# Patient Record
Sex: MALE | Race: WHITE | NOT HISPANIC OR LATINO | Employment: UNEMPLOYED | URBAN - METROPOLITAN AREA
[De-identification: names, ages, dates, MRNs, and addresses within clinical notes are randomized per-mention and may not be internally consistent; named-entity substitution may affect disease eponyms.]

---

## 2021-04-22 ENCOUNTER — HOSPITAL ENCOUNTER (EMERGENCY)
Facility: HOSPITAL | Age: 21
Discharge: HOME | End: 2021-04-22
Attending: EMERGENCY MEDICINE | Admitting: EMERGENCY MEDICINE

## 2021-04-22 VITALS
OXYGEN SATURATION: 99 % | WEIGHT: 294.3 LBS | DIASTOLIC BLOOD PRESSURE: 96 MMHG | RESPIRATION RATE: 18 BRPM | BODY MASS INDEX: 39 KG/M2 | HEART RATE: 83 BPM | TEMPERATURE: 98.1 F | SYSTOLIC BLOOD PRESSURE: 145 MMHG | HEIGHT: 73 IN

## 2021-04-22 DIAGNOSIS — S61.412A LACERATION OF LEFT HAND WITHOUT FOREIGN BODY, INITIAL ENCOUNTER: Primary | ICD-10-CM

## 2021-04-22 PROCEDURE — 25010000002 TDAP 5-2.5-18.5 LF-MCG/0.5 SUSPENSION

## 2021-04-22 PROCEDURE — 99282 EMERGENCY DEPT VISIT SF MDM: CPT

## 2021-04-22 PROCEDURE — 90471 IMMUNIZATION ADMIN: CPT

## 2021-04-22 PROCEDURE — 90715 TDAP VACCINE 7 YRS/> IM: CPT

## 2021-04-22 PROCEDURE — 12001 RPR S/N/AX/GEN/TRNK 2.5CM/<: CPT | Performed by: EMERGENCY MEDICINE

## 2021-04-22 RX ORDER — LIDOCAINE HYDROCHLORIDE 10 MG/ML
10 INJECTION, SOLUTION EPIDURAL; INFILTRATION; INTRACAUDAL; PERINEURAL ONCE
Status: COMPLETED | OUTPATIENT
Start: 2021-04-22 | End: 2021-04-22

## 2021-04-22 RX ORDER — CEPHALEXIN 500 MG/1
500 CAPSULE ORAL 3 TIMES DAILY
Qty: 15 CAPSULE | Refills: 0 | Status: SHIPPED | OUTPATIENT
Start: 2021-04-22 | End: 2021-04-27

## 2021-04-22 RX ADMIN — TETANUS TOXOID, REDUCED DIPHTHERIA TOXOID AND ACELLULAR PERTUSSIS VACCINE, ADSORBED 0.5 ML: 5; 2.5; 8; 8; 2.5 SUSPENSION INTRAMUSCULAR at 10:45

## 2021-04-22 RX ADMIN — LIDOCAINE HYDROCHLORIDE 10 ML: 10 INJECTION, SOLUTION EPIDURAL; INFILTRATION; INTRACAUDAL; PERINEURAL at 11:00

## 2021-04-22 NOTE — ED PROVIDER NOTES
" EMERGENCY DEPARTMENT ENCOUNTER    Room Number:  09/09  Date seen:  4/22/2021  PCP: Provider, No Known  Historian: Patient      HPI:  Chief Complaint: Left hand laceration  A complete HPI/ROS/PMH/PSH/SH/FH are unobtainable due to: Nothing  Context: Hebert Ibarra is a 20 y.o. male who presents to the ED c/o laceration to his left hand that occurred today while he was using a pocket knife to cut up some sticks.  He reports that the knife struck his left hand.  He reports minimal bleeding prior to arrival.  His pain is currently mild, localized to the left hand, nonradiating.  He is unsure of his last tetanus booster.  He has been using the knife to cut up the sticks.  He denies any tingling or numbness in his fingers.  He denies any weakness in his thumb or fingers.            PAST MEDICAL HISTORY  Active Ambulatory Problems     Diagnosis Date Noted   • No Active Ambulatory Problems     Resolved Ambulatory Problems     Diagnosis Date Noted   • No Resolved Ambulatory Problems     No Additional Past Medical History         PAST SURGICAL HISTORY  History reviewed. No pertinent surgical history.      FAMILY HISTORY  History reviewed. No pertinent family history.      SOCIAL HISTORY  Social History     Socioeconomic History   • Marital status: Single     Spouse name: Not on file   • Number of children: Not on file   • Years of education: Not on file   • Highest education level: Not on file   Tobacco Use   • Smoking status: Never Smoker   Substance and Sexual Activity   • Alcohol use: Yes     Comment: \"hardly any\"   • Sexual activity: Never         ALLERGIES  Patient has no known allergies.        REVIEW OF SYSTEMS  Review of Systems   Review of all 14 systems is negative other than stated in the HPI above.      PHYSICAL EXAM  ED Triage Vitals [04/22/21 1038]   Temp Heart Rate Resp BP SpO2   98.1 °F (36.7 °C) 83 18 145/96 99 %      Temp src Heart Rate Source Patient Position BP Location FiO2 (%)   Oral Monitor Sitting Right " arm --         GENERAL: Awake and alert, no acute distress  HENT: nares patent  EYES: no scleral icterus  CV: 2+ left radial pulse, regular rhythm  RESPIRATORY: normal effort  MUSCULOSKELETAL: no deformity.  There is normal flexion and extension at all MCP, PIP, DIP joints.  There is intact abduction, adduction, flexion, extension, opposition of the left first digit.  NEURO: alert, moves all extremities, follows commands.  Normal sensation to light touch throughout the left hand.  PSYCH:  calm, cooperative  SKIN: warm, dry.  2 cm laceration on the thenar eminence of the left hand, approximately 5 mm in depth, no obvious foreign body, no active bleeding.    Vital signs and nursing notes reviewed.          LAB RESULTS  No results found for this or any previous visit (from the past 24 hour(s)).    Ordered the above labs and reviewed the results.        RADIOLOGY  No Radiology Exams Resulted Within Past 24 Hours    Ordered the above noted radiological studies. Reviewed by me in PACS.            PROCEDURES  Laceration Repair    Date/Time: 4/22/2021 11:18 AM  Performed by: John Babcock MD  Authorized by: John Babcock MD     Consent:     Consent obtained:  Verbal    Risks discussed:  Infection and pain  Anesthesia (see MAR for exact dosages):     Anesthesia method:  Local infiltration    Local anesthetic:  Lidocaine 1% w/o epi  Laceration details:     Location:  Hand    Hand location:  L palm    Length (cm):  2    Depth (mm):  5  Repair type:     Repair type:  Simple  Pre-procedure details:     Preparation:  Patient was prepped and draped in usual sterile fashion  Exploration:     Hemostasis achieved with:  Direct pressure    Wound extent: no foreign bodies/material noted, no muscle damage noted and no tendon damage noted      Contaminated: no    Treatment:     Area cleansed with:  Betadine    Amount of cleaning:  Standard    Irrigation solution:  Sterile saline    Irrigation volume:  500cc     Irrigation method:  Pressure wash  Skin repair:     Repair method:  Sutures    Suture size:  4-0    Suture material:  Nylon    Suture technique:  Simple interrupted    Number of sutures:  5  Approximation:     Approximation:  Close  Post-procedure details:     Dressing:  Antibiotic ointment and adhesive bandage    Patient tolerance of procedure:  Tolerated well, no immediate complications                  MEDICATIONS GIVEN IN ER  Medications   Tdap (BOOSTRIX) injection 0.5 mL (0.5 mL Intramuscular Given 4/22/21 1045)   lidocaine PF 1% (XYLOCAINE) injection 10 mL (10 mL Infiltration Given by Other 4/22/21 1100)                   MEDICAL DECISION MAKING, PROGRESS, and CONSULTS    All labs have been independently reviewed by me.  All radiology studies have been reviewed by me and discussed with radiologist dictating the report.   EKG's independently viewed and interpreted by me.  Discussion below represents my analysis of pertinent findings related to patient's condition, differential diagnosis, treatment plan and final disposition.       Simple laceration of the left hand without evidence of vascular involvement, tendon involvement, nerve involvement, or foreign body.  Wound was copiously irrigated and closed primarily.  Tdap updated.  Home with Keflex prophylaxis x5 days.  Follow-up for suture removal in 7 to 10 days.  Return precautions were discussed.         I wore a mask, face shield, and gloves during this patient encounter.  Patient also wearing a surgical mask.  Hand hygeine performed before and after seeing the patient.    DIAGNOSIS  Final diagnoses:   Laceration of left hand without foreign body, initial encounter         DISPOSITION  DISCHARGE    Patient discharged in stable condition.    Reviewed implications of results, diagnosis, meds, responsibility to follow up, warning signs and symptoms of possible worsening, potential complications and reasons to return to ER.    Patient/Family voiced understanding  of above instructions.    Discussed plan for discharge, as there is no emergent indication for admission. Patient referred to primary care provider for BP management due to today's BP. Pt/family is agreeable and understands need for follow up and repeat testing.  Pt is aware that discharge does not mean that nothing is wrong but it indicates no emergency is present that requires admission and they must continue care with follow-up as given below or physician of their choice.     FOLLOW-UP  PATIENT CONNECTION - [facility] [address]  [phone]  Call in 1 week  For suture removal         Medication List      New Prescriptions    cephalexin 500 MG capsule  Commonly known as: KEFLEX  Take 1 capsule by mouth 3 (Three) Times a Day for 5 days.           Where to Get Your Medications      These medications were sent to [id number] - [place] - [address] - [phone]  - [phone] FX  [address], [place] [address]    Phone: [phone]   · cephalexin 500 MG capsule                   Latest Documented Vital Signs:  As of 11:20 EDT  BP- 145/96 HR- 83 Temp- 98.1 °F (36.7 °C) (Oral) O2 sat- 99%        --    Please note that portions of this were completed with a voice recognition program.          John Babcock MD  04/22/21 6699